# Patient Record
Sex: FEMALE | Race: BLACK OR AFRICAN AMERICAN | NOT HISPANIC OR LATINO | Employment: FULL TIME | URBAN - METROPOLITAN AREA
[De-identification: names, ages, dates, MRNs, and addresses within clinical notes are randomized per-mention and may not be internally consistent; named-entity substitution may affect disease eponyms.]

---

## 2020-11-03 ENCOUNTER — OFFICE VISIT (OUTPATIENT)
Dept: OPHTHALMOLOGY | Facility: CLINIC | Age: 45
End: 2020-11-03

## 2020-11-03 DIAGNOSIS — H16.9 KERATITIS: ICD-10-CM

## 2020-11-03 DIAGNOSIS — H52.13 MYOPIA, BILATERAL: ICD-10-CM

## 2020-11-03 DIAGNOSIS — I10 ESSENTIAL HYPERTENSION: Primary | ICD-10-CM

## 2020-11-03 DIAGNOSIS — Z46.0 ENCOUNTER FOR FITTING OR ADJUSTMENT OF SPECTACLES OR CONTACT LENSES: ICD-10-CM

## 2020-11-03 DIAGNOSIS — H52.4 BILATERAL PRESBYOPIA: ICD-10-CM

## 2020-11-03 PROCEDURE — 92015 PR REFRACTION: ICD-10-PCS | Mod: S$GLB,,, | Performed by: OPTOMETRIST

## 2020-11-03 PROCEDURE — 92310 PR CONTACT LENS FITTING (NO CHANGE): ICD-10-PCS | Mod: CSM,S$GLB,, | Performed by: OPTOMETRIST

## 2020-11-03 PROCEDURE — 92015 DETERMINE REFRACTIVE STATE: CPT | Mod: S$GLB,,, | Performed by: OPTOMETRIST

## 2020-11-03 PROCEDURE — 99999 PR PBB SHADOW E&M-NEW PATIENT-LVL II: ICD-10-PCS | Mod: PBBFAC,,, | Performed by: OPTOMETRIST

## 2020-11-03 PROCEDURE — 99999 PR PBB SHADOW E&M-NEW PATIENT-LVL II: CPT | Mod: PBBFAC,,, | Performed by: OPTOMETRIST

## 2020-11-03 PROCEDURE — 92310 CONTACT LENS FITTING OU: CPT | Mod: CSM,S$GLB,, | Performed by: OPTOMETRIST

## 2020-11-03 PROCEDURE — 92004 PR EYE EXAM, NEW PATIENT,COMPREHESV: ICD-10-PCS | Mod: S$GLB,,, | Performed by: OPTOMETRIST

## 2020-11-03 PROCEDURE — 92004 COMPRE OPH EXAM NEW PT 1/>: CPT | Mod: S$GLB,,, | Performed by: OPTOMETRIST

## 2020-11-03 RX ORDER — PROPRANOLOL HYDROCHLORIDE 60 MG/1
60 CAPSULE, EXTENDED RELEASE ORAL
COMMUNITY
Start: 2020-10-08

## 2020-11-03 NOTE — PROGRESS NOTES
HPI     Hard to focus sometimes at near.  Last eye exam 1 year at [facility].  Last eye exam with [facility] 10/27/2014.  Update glasses and contact lenses RX.  Discuss fee to update glasses RX.    Last edited by Juan Benoit, OD on 11/3/2020 12:51 PM. (History)            Assessment /Plan     For exam results, see Encounter Report.    Essential hypertension    Keratitis    Myopia, bilateral    Encounter for fitting or adjustment of spectacles or contact lenses    Bilateral presbyopia      No HTN Retinopathy    Worksheet given. Discussed Dry Eyes in detail including Artificial Tears, lubricants, and Omega 3 Fish Oils.    Discussed CL charges.  Dispense Final Rx for glasses  No changes CL Rx  RTC 1 year  Discussed above and answered questions.

## 2021-04-28 ENCOUNTER — PATIENT MESSAGE (OUTPATIENT)
Dept: RESEARCH | Facility: HOSPITAL | Age: 46
End: 2021-04-28

## 2025-03-18 ENCOUNTER — OFFICE VISIT (OUTPATIENT)
Dept: OPHTHALMOLOGY | Facility: CLINIC | Age: 50
End: 2025-03-18
Payer: COMMERCIAL

## 2025-03-18 DIAGNOSIS — H43.821 VITREOMACULAR TRACTION, RIGHT: Primary | ICD-10-CM

## 2025-03-18 DIAGNOSIS — I10 PRIMARY HYPERTENSION: ICD-10-CM

## 2025-03-18 DIAGNOSIS — H52.13 MYOPIA WITH PRESBYOPIA OF BOTH EYES: ICD-10-CM

## 2025-03-18 DIAGNOSIS — H52.4 MYOPIA WITH PRESBYOPIA OF BOTH EYES: ICD-10-CM

## 2025-03-18 PROCEDURE — 92004 COMPRE OPH EXAM NEW PT 1/>: CPT | Mod: ,,, | Performed by: OPTOMETRIST

## 2025-03-18 PROCEDURE — 99999 PR PBB SHADOW E&M-EST. PATIENT-LVL III: CPT | Mod: PBBFAC,,, | Performed by: OPTOMETRIST

## 2025-03-18 PROCEDURE — 92015 DETERMINE REFRACTIVE STATE: CPT | Mod: ,,, | Performed by: OPTOMETRIST

## 2025-03-18 PROCEDURE — 92134 CPTRZ OPH DX IMG PST SGM RTA: CPT | Mod: ,,, | Performed by: OPTOMETRIST

## 2025-03-18 NOTE — PROGRESS NOTES
HPI     Hypertensive Eye Exam            Comments: No specialty comments available.            Comments    NP here for annual  HTN eye exam  Pt states her eyes are getting worse seems like its harder to focus and   has to wait a couple seconds to refocus, feels lightheaded like her eyes   are stuck.   Has floaters denies flashes of lights            Last edited by James Smith on 3/18/2025  2:42 PM.            Assessment /Plan     For exam results, see Encounter Report.    1. Vitreomacular traction, right  -     OCT, Retina - OU - Both Eyes  RD precautions reviewed with patient.  Refer to Dr. CARROLL for consult.       2. Myopia with presbyopia of both eyes  Eyeglass Final Rx       Eyeglass Final Rx         Sphere Cylinder Axis Add    Right -5.25 +0.25 175 +2.00    Left -5.25 DS  +2.00      Type: PAL    Expiration Date: 3/18/2026                  Contact Lens Final Rx       Final Contact Lens Rx         Brand Base Curve Diameter Sphere    Right Total 30 8.4 14.2 -3.75    Left Total 30 8.4 14.2 -5.00      Expiration Date: 3/18/2026    Replacement: Monthly    Solutions: OptiFree PureMoist    Wearing Schedule: Daily Wear                  OD near, OS distance.    Contact lens trials fitted in office today. Contact lens hygiene reviewed. Patient able to insert the lenses themselves with minimal difficulty. Patient ok to finalize Contact lens after 1 week of wear. RTC if still having difficulty with CTL trial after 1 week.     RTC with Dr. Sommers for retinal eval, sooner if changes to vision or worsening symptoms.  Discussed above and answered questions.

## 2025-05-13 ENCOUNTER — OFFICE VISIT (OUTPATIENT)
Dept: OPHTHALMOLOGY | Facility: CLINIC | Age: 50
End: 2025-05-13
Payer: COMMERCIAL

## 2025-05-13 DIAGNOSIS — H52.4 MYOPIA WITH PRESBYOPIA OF BOTH EYES: ICD-10-CM

## 2025-05-13 DIAGNOSIS — H43.821 VITREOMACULAR TRACTION, RIGHT: Primary | ICD-10-CM

## 2025-05-13 DIAGNOSIS — I10 PRIMARY HYPERTENSION: ICD-10-CM

## 2025-05-13 DIAGNOSIS — H52.13 MYOPIA WITH PRESBYOPIA OF BOTH EYES: ICD-10-CM

## 2025-05-13 PROCEDURE — 99999 PR PBB SHADOW E&M-EST. PATIENT-LVL III: CPT | Mod: PBBFAC,,, | Performed by: OPHTHALMOLOGY

## 2025-05-13 PROCEDURE — 92134 CPTRZ OPH DX IMG PST SGM RTA: CPT | Mod: S$GLB,,, | Performed by: OPHTHALMOLOGY

## 2025-05-13 PROCEDURE — 1159F MED LIST DOCD IN RCRD: CPT | Mod: CPTII,S$GLB,, | Performed by: OPHTHALMOLOGY

## 2025-05-13 PROCEDURE — 99203 OFFICE O/P NEW LOW 30 MIN: CPT | Mod: S$GLB,,, | Performed by: OPHTHALMOLOGY

## 2025-05-13 PROCEDURE — 1160F RVW MEDS BY RX/DR IN RCRD: CPT | Mod: CPTII,S$GLB,, | Performed by: OPHTHALMOLOGY

## 2025-05-13 NOTE — PROGRESS NOTES
===============================  Date today is 5/13/2025  Gabby Corona is a 50 y.o. female  Last visit [facility]: :Visit date not found   Last visit eye dept. Visit date not found    Corrected distance visual acuity was 20/30 in the right eye and 20/20 in the left eye.  Tonometry       Tonometry (Applanation, 1:41 PM)         Right Left    Pressure 13 12                  Wearing Rx       Wearing Rx         Sphere Cylinder Axis Add    Right -5.25 +0.25 175 +2.00    Left -5.25 DS  +2.00      Type: PAL                  Not recorded       Not recorded       Chief Complaint   Patient presents with    Vitreomacular traction, right     Retinal Eval per Dr. López     HPI     Vitreomacular traction, right            Comments: Retinal Eval per Dr. López         Last edited by Anu Armenta on 5/13/2025  1:33 PM.      Problem List Items Addressed This Visit    None  Visit Diagnoses         Vitreomacular traction, right    -  Primary    Relevant Orders    Posterior Segment OCT Retina-Both eyes (Completed)      Primary hypertension          Myopia with presbyopia of both eyes              Instructed to call 24/7 for any worsening of vision, visual distortion or pain.  Check OU independently daily.    Gave my office and personal cell phone number.  ________________  5/13/2025 today  Gabby Corona    OD VMT  OCT better OD, resolved VMT, OS stable  Clear lens OU  Sharp disc OU  Macula looks good OU  Ok to continue care with Dr. López    RTC as scheduled with Dr. López  Instructed to call 24/7 for any worsening of vision or symptoms. Check OU daily.   Gave my office and cell phone number.    =============================